# Patient Record
Sex: FEMALE | Race: BLACK OR AFRICAN AMERICAN | HISPANIC OR LATINO
[De-identification: names, ages, dates, MRNs, and addresses within clinical notes are randomized per-mention and may not be internally consistent; named-entity substitution may affect disease eponyms.]

---

## 2020-10-06 ENCOUNTER — APPOINTMENT (OUTPATIENT)
Dept: OBGYN | Facility: CLINIC | Age: 60
End: 2020-10-06
Payer: COMMERCIAL

## 2020-10-06 VITALS
SYSTOLIC BLOOD PRESSURE: 118 MMHG | BODY MASS INDEX: 32.58 KG/M2 | WEIGHT: 220 LBS | DIASTOLIC BLOOD PRESSURE: 80 MMHG | HEIGHT: 69 IN

## 2020-10-06 DIAGNOSIS — Z86.39 PERSONAL HISTORY OF OTHER ENDOCRINE, NUTRITIONAL AND METABOLIC DISEASE: ICD-10-CM

## 2020-10-06 DIAGNOSIS — R87.619 UNSPECIFIED ABNORMAL CYTOLOGICAL FINDINGS IN SPECIMENS FROM CERVIX UTERI: ICD-10-CM

## 2020-10-06 DIAGNOSIS — I10 ESSENTIAL (PRIMARY) HYPERTENSION: ICD-10-CM

## 2020-10-06 DIAGNOSIS — Z83.3 FAMILY HISTORY OF DIABETES MELLITUS: ICD-10-CM

## 2020-10-06 LAB
DATE COLLECTED: NORMAL
HEMOCCULT SP1 STL QL: NEGATIVE
QUALITY CONTROL: YES

## 2020-10-06 PROCEDURE — 99396 PREV VISIT EST AGE 40-64: CPT

## 2020-10-06 PROCEDURE — 82270 OCCULT BLOOD FECES: CPT

## 2020-10-06 PROCEDURE — 99214 OFFICE O/P EST MOD 30 MIN: CPT | Mod: 25

## 2020-10-06 NOTE — COUNSELING
[Nutrition/ Exercise/ Weight Management] : nutrition, exercise, weight management [Vitamins/Supplements] : vitamins/supplements [Sunscreen] : sunscreen [Smoking Cessation] : smoking cessation [Drugs/Alcohol] : drugs, alcohol [STD (testing, results, tx)] : STD (testing, results, tx)

## 2020-10-06 NOTE — HISTORY OF PRESENT ILLNESS
[FreeTextEntry1] : 59yo G1 P 0010 PMF with T2DM and HTN here for annual. Pt had "pimple-like skin lesion" under left breast that she never follow up on.  She says it is now saying that it is getting larger.  Pt has not had any recent travel, known sick contacts or contact with any PUI, of coronavirus sxs.\par

## 2020-10-06 NOTE — DISCUSSION/SUMMARY
[FreeTextEntry1] : 59yo  PMF here for annual. Pt with lesion on breast today that could be skin related vs breast related (pt reports having normal mammo and sono last year). \par \par Breast lesion: \par - mammo/sono rx given today. Pt urged to go ASAP  \par - Pt urged to see Derm ASAP in the event this is not a breast issue\par \par Goiter: \par - Thyroid sono ordered today \par \par RHM: \par - DVS neg x 3\par - Dentist, PCP, Derm as discussed \par - Rx given for DEXA, mammo/sono\par - Colonoscopy as discussed (given Dr Helm's info) \par - Offered STI screen today. Pt declined \par - Encouraged healthy diet, exercise, weight loss \par \par Varsha Brunson MD \par Obstetrician/Gynecologist\par

## 2020-10-13 LAB
CYTOLOGY CVX/VAG DOC THIN PREP: NORMAL
HPV HIGH+LOW RISK DNA PNL CVX: NOT DETECTED

## 2020-11-13 ENCOUNTER — APPOINTMENT (OUTPATIENT)
Dept: OBGYN | Facility: CLINIC | Age: 60
End: 2020-11-13
Payer: COMMERCIAL

## 2020-11-13 ENCOUNTER — ASOB RESULT (OUTPATIENT)
Age: 60
End: 2020-11-13

## 2020-11-13 PROCEDURE — 76856 US EXAM PELVIC COMPLETE: CPT | Mod: 59

## 2020-11-13 PROCEDURE — 76830 TRANSVAGINAL US NON-OB: CPT | Mod: 59

## 2020-11-25 ENCOUNTER — APPOINTMENT (OUTPATIENT)
Dept: OBGYN | Facility: CLINIC | Age: 60
End: 2020-11-25
Payer: COMMERCIAL

## 2020-11-25 PROCEDURE — 99213 OFFICE O/P EST LOW 20 MIN: CPT | Mod: 95

## 2020-12-28 NOTE — HISTORY OF PRESENT ILLNESS
[Home] : at home, [unfilled] , at the time of the visit. [Verbal consent obtained from patient] : the patient, [unfilled] [FreeTextEntry1] : 59yo G1 P 0010 PMF with T2DM and HTN being followed up via telehealth visit to review sono results. Her sono demonstrates a 6 x 6 x6cm subserosal fibroid and a 2mm EMS. Neither ovary was visualized. \par \par Her pap, HPV, and FOBT are neg.

## 2020-12-28 NOTE — DISCUSSION/SUMMARY
[FreeTextEntry1] : 61yo G1 P 0010 PMF with T2DM and HTN with 1- 6cm fibroid. We discussed that fibroid usually don’t grow in menopause however we need to closely monitor them. I would like to compare her prior sono results to see if there has been any growth. If there are no sonos to compare we will repeat sono in 3 months to establish stability.  All questions solicited and answered. \par \par Varsha Brunson MD \par Obstetrician/Gynecologist\par

## 2021-01-04 ENCOUNTER — RESULT REVIEW (OUTPATIENT)
Age: 61
End: 2021-01-04

## 2021-01-04 ENCOUNTER — APPOINTMENT (OUTPATIENT)
Dept: MAMMOGRAPHY | Facility: CLINIC | Age: 61
End: 2021-01-04
Payer: COMMERCIAL

## 2021-01-04 PROCEDURE — 77067 SCR MAMMO BI INCL CAD: CPT

## 2021-01-04 PROCEDURE — 77063 BREAST TOMOSYNTHESIS BI: CPT

## 2021-01-15 DIAGNOSIS — R92.2 INCONCLUSIVE MAMMOGRAM: ICD-10-CM

## 2021-02-15 ENCOUNTER — RESULT REVIEW (OUTPATIENT)
Age: 61
End: 2021-02-15

## 2021-02-15 ENCOUNTER — APPOINTMENT (OUTPATIENT)
Dept: MAMMOGRAPHY | Facility: CLINIC | Age: 61
End: 2021-02-15
Payer: COMMERCIAL

## 2021-02-15 ENCOUNTER — APPOINTMENT (OUTPATIENT)
Dept: ULTRASOUND IMAGING | Facility: CLINIC | Age: 61
End: 2021-02-15
Payer: COMMERCIAL

## 2021-02-15 PROCEDURE — 76642 ULTRASOUND BREAST LIMITED: CPT | Mod: 50

## 2021-02-15 PROCEDURE — 77066 DX MAMMO INCL CAD BI: CPT

## 2021-02-15 PROCEDURE — G0279: CPT

## 2021-02-16 ENCOUNTER — NON-APPOINTMENT (OUTPATIENT)
Age: 61
End: 2021-02-16

## 2021-03-16 ENCOUNTER — APPOINTMENT (OUTPATIENT)
Dept: ULTRASOUND IMAGING | Facility: CLINIC | Age: 61
End: 2021-03-16
Payer: COMMERCIAL

## 2021-03-16 PROCEDURE — 76536 US EXAM OF HEAD AND NECK: CPT

## 2021-03-24 ENCOUNTER — NON-APPOINTMENT (OUTPATIENT)
Age: 61
End: 2021-03-24

## 2021-03-29 ENCOUNTER — APPOINTMENT (OUTPATIENT)
Dept: BREAST CENTER | Facility: CLINIC | Age: 61
End: 2021-03-29
Payer: COMMERCIAL

## 2021-03-29 VITALS
TEMPERATURE: 97.3 F | HEART RATE: 71 BPM | DIASTOLIC BLOOD PRESSURE: 84 MMHG | HEIGHT: 69 IN | WEIGHT: 210 LBS | BODY MASS INDEX: 31.1 KG/M2 | SYSTOLIC BLOOD PRESSURE: 142 MMHG

## 2021-03-29 DIAGNOSIS — R92.8 OTHER ABNORMAL AND INCONCLUSIVE FINDINGS ON DIAGNOSTIC IMAGING OF BREAST: ICD-10-CM

## 2021-03-29 PROCEDURE — 99243 OFF/OP CNSLTJ NEW/EST LOW 30: CPT

## 2021-03-29 PROCEDURE — 99072 ADDL SUPL MATRL&STAF TM PHE: CPT

## 2021-03-29 NOTE — DATA REVIEWED
[FreeTextEntry1] : 1/4/21 Ashly: bilat sMMG, TC 23.6% scattered FG density, asymmetric densities in inf breasts bilat, 5-6% right and 7:00 left. BR0 for spot compression and u/s.\par \par 2/15/21 Ashly: bilat u/s and compression: TC 21.6% Right asymmetry mostly dissipates with compression. Left asymmetry stable back to 2019. Right f.u mmg rec in 6 mos.  Bilat U/s: Right 4 mm cyst at 6:00, Left: 3.5 cm skin thickening unchanged from 2019.  no

## 2021-03-29 NOTE — ASSESSMENT
[FreeTextEntry1] : 59 y/o black female referred for consultation per Dr. Varsha Brunson for high risk of breast cancer. TC 21.6%\par \par 1/4/21 [facility]: bilat sMMG, TC 23.6% scattered FG density, asymmetric densities in inf breasts bilat, 5-6% right and 7:00 left. BR0 for spot compression and u/s.\par \par 2/15/21 Ashly: bilat u/s and compression: TC 21.6% Right asymmetry mostly dissipates with compression. Left asymmetry stable back to 2019. Right f.u mmg rec in 6 mos.  Bilat U/s: Right 4 mm cyst at 6:00, Left: 3.5 cm skin thickening unchanged from 2019, BR3\par \par Fhx; Breast cancer mother at 58 and pGM at 70. Pt is not AJ and no daughters. Has 1 sister. \par \par Pt denies any breast lesions, discharge or masses. Has an area of breast thickening after folliculitis she has had for years. Saw dermatologist. \par CBE: No suspicious findings. Pt with keloid/hypertrophic scar at inferior left breast from hx of folliculitis. No axillary or SC lymphadenopathy.\par Reviewed studies with pt. Rec for 6 mos f.u mmg 8/21, can do it at the same time as MRI. Would rec HRP due to increased LT risk. Recalculated TC was 30.5% lifetime risk.\par Reviewed rec for mmg/u/s screening alternating with MRI screening q 6 mos with CBE q 6 mos (can see PCP or gyn for one of them). F.u sooner in interim if breast issues. Rec HRP with NP.

## 2021-03-29 NOTE — HISTORY OF PRESENT ILLNESS
[FreeTextEntry1] : 59 y/o black female referred for consultation per Dr. Varsha Brunson for high risk of breast cancer. TC 21.6%\par \par 1/4/21 Ash[facility]: bilat sMMG, TC 23.6% scattered FG density, asymmetric densities in inf breasts bilat, 5-6% right and 7:00 left. BR0 for spot compression and u/s.\par \par 2/15/21 Ash[facility]: bilat u/s and compression: TC 21.6% Right asymmetry mostly dissipates with compression. Left asymmetry stable back to 2019. Right f.u mmg rec in 6 mos.  Bilat U/s: Right 4 mm cyst at 6:00, Left: 3.5 cm skin thickening unchanged from 2019, BR3\par \par Fhx; Breast cancer mother at 58 and pGM at 70. Pt is not AJ and no daughters. Has 1 sister. \par \par Pt denies any breast lesions, discharge or masses. Has an area of breast thickening after folliculitis she has had for years. Saw dermatologist.

## 2021-03-29 NOTE — PHYSICAL EXAM
[Bra Size: ___] : Bra Size: [unfilled] [Normocephalic] : normocephalic [Atraumatic] : atraumatic [Supple] : supple [No Supraclavicular Adenopathy] : no supraclavicular adenopathy [Examined in the supine and seated position] : examined in the supine and seated position [No dominant masses] : no dominant masses in right breast  [No dominant masses] : no dominant masses left breast [No Nipple Retraction] : no left nipple retraction [No Nipple Discharge] : no left nipple discharge [No Axillary Lymphadenopathy] : no left axillary lymphadenopathy [No Edema] : no edema [No Swelling] : no swelling [Full ROM] : full range of motion [No Rashes] : no rashes [No Ulceration] : no ulceration [de-identified] : Has goiter [de-identified] : Inferior part of flap, consistent with 2 cm area of hypertrophic/keloid scar.

## 2021-03-29 NOTE — PAST MEDICAL HISTORY
[Menarche Age ____] : age at menarche was [unfilled] [Menopause Age____] : age at menopause was [unfilled] [Total Preg ___] : G[unfilled] [Live Births ___] : P[unfilled]  [Living ___] : Living: [unfilled]

## 2021-03-29 NOTE — CONSULT LETTER
[Dear  ___] : Dear  [unfilled], [Consult Letter:] : I had the pleasure of evaluating your patient, [unfilled]. [Please see my note below.] : Please see my note below. [Consult Closing:] : Thank you very much for allowing me to participate in the care of this patient.  If you have any questions, please do not hesitate to contact me. [Sincerely,] : Sincerely, [FreeTextEntry3] : Nevaeh Carver MD  [DrStewart  ___] : Dr. GUADARRAMA

## 2021-04-20 ENCOUNTER — APPOINTMENT (OUTPATIENT)
Dept: ENDOCRINOLOGY | Facility: CLINIC | Age: 61
End: 2021-04-20
Payer: COMMERCIAL

## 2021-04-20 VITALS
DIASTOLIC BLOOD PRESSURE: 70 MMHG | SYSTOLIC BLOOD PRESSURE: 130 MMHG | HEART RATE: 72 BPM | HEIGHT: 69 IN | BODY MASS INDEX: 31.55 KG/M2 | WEIGHT: 213 LBS

## 2021-04-20 DIAGNOSIS — Z78.9 OTHER SPECIFIED HEALTH STATUS: ICD-10-CM

## 2021-04-20 LAB — GLUCOSE BLDC GLUCOMTR-MCNC: 269

## 2021-04-20 PROCEDURE — 99204 OFFICE O/P NEW MOD 45 MIN: CPT | Mod: 25

## 2021-04-20 PROCEDURE — 99072 ADDL SUPL MATRL&STAF TM PHE: CPT

## 2021-04-20 PROCEDURE — 82962 GLUCOSE BLOOD TEST: CPT

## 2021-04-20 NOTE — HISTORY OF PRESENT ILLNESS
[FreeTextEntry1] : Prior endo - 25 yrs ago, Dr. Claudio ([facility])\par =========================================================================\par Quality/Duration/Onset: Goiter on exam for many years. Thyroid sonogram 3/2021 showed thyroid nodules.\par Severity: mild\par Associated symptoms: Denies anterior neck pain, dysphagia or voice changes. No thyroid meds. No herbal meds.\par \par MODIFYING FACTORS: \par Medication history - no thyroid meds\par Current thyroid medication: none\par Herbal meds/OTC - cod liver oil, vit D, turmeric\par No family history of thyroid disease. No radiation exposure to the neck\par ========================================================================\par Quality: Type 2\par Severity: severe, uncontrolled\par Duration:2012\par Onset: routine blood test, gluc 350's\par \par ASSOCIATED SYMPTOMS/COMPLICATIONS:\par Eye exam - (+) retinal issue, eye doctor - Dr. Walker, retina: \par Urine microalb/Cr neg 4/2021\par Neuropathy (+) itchiness on feet\par \par MODIFYING FACTORS: \par Lifestyle:Inconsistent w diet - stopped diet in 2020 and restarted diet 1/2021 - low crab and intermittent fastig 1-2 days/week.  She does not exercise.\par Medication history: Initially started on Glyburide and MFN.  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 Has not been testing FS\par \par \par

## 2021-04-20 NOTE — DATA REVIEWED
[FreeTextEntry1] : Thyroid sonogram 3/16/21 - Left nodule 6 mm, 6 mm and 5 mm nodules - hypoechoic\par \par Labs 4/9/21\par urine alb/Cr 4\par , HDL 50, Tg 85\par Gluc 226\par Cr 0.63, GFR 99\par A1c 9.9\par TSH 2.55, Ft4 1.3\par B12 555\par Vit D 16

## 2021-04-20 NOTE — REASON FOR VISIT
[Initial Evaluation] : an initial evaluation [DM Type 2] : DM Type 2 [Thyroid nodule/ MNG] : thyroid nodule/ MNG [FreeTextEntry2] : self referred

## 2021-04-20 NOTE — CONSULT LETTER
[Dear  ___] : Dear  [unfilled], [Courtesy Letter:] : I had the pleasure of seeing your patient, [unfilled], in my office today. [Please see my note below.] : Please see my note below. [Consult Closing:] : Thank you very much for allowing me to participate in the care of this patient.  If you have any questions, please do not hesitate to contact me. [Sincerely,] : Sincerely, [FreeTextEntry3] : Melina Dickson, \par

## 2021-04-20 NOTE — PHYSICAL EXAM
[Alert] : alert [Obese] : obese [EOMI] : extra ocular movement intact [No LAD] : no lymphadenopathy [No Thyroid Nodules] : no palpable thyroid nodules [No Accessory Muscle Use] : no accessory muscle use [Clear to Auscultation] : lungs were clear to auscultation bilaterally [Normal S1, S2] : normal S1 and S2 [Normal Rate] : heart rate was normal [No Edema] : no peripheral edema [Normal Bowel Sounds] : normal bowel sounds [Not Tender] : non-tender [Soft] : abdomen soft [Normal Gait] : normal gait [Foot Ulcers] : no foot ulcers [Right Foot Was Examined] : right foot ~C was examined [Left Foot Was Examined] : left foot ~C was examined [2+] : 2+ in the dorsalis pedis [Vibration Dec.] : normal vibratory sensation at the level of the toes [Position Sense Dec.] : normal position sense at the level of the toes [Diminished Throughout Both Feet] : normal tactile sensation with monofilament testing throughout both feet [Cranial Nerves Intact] : cranial nerves 2-12 were intact [Oriented x3] : oriented to person, place, and time [Normal Affect] : the affect was normal [Normal Insight/Judgement] : insight and judgment were intact [Normal Mood] : the mood was normal [de-identified] : trace thyroid enlargement, nontender, soft

## 2021-04-20 NOTE — ASSESSMENT
[Long Term Vascular Complications] : long term vascular complications of diabetes [Importance of Diet and Exercise] : importance of diet and exercise to improve glycemic control, achieve weight loss and improve cardiovascular health [Self Monitoring of Blood Glucose] : self monitoring of blood glucose [Retinopathy Screening] : Patient was referred to ophthalmology for retinopathy screening [FreeTextEntry1] : 60 yr old female with:\par 1. poorly controlled T2DM with possible retinopathy- A1c 9.9%\par - she refuses insulin and oral meds and prefers to control diabetes w diet\par - advised against not taking meds but she should take meds along w lifestyle changes to help control diabetes and reduce long term complications, we also discussed intermittent fasting - advises against prolonged fasting which can cause nutritional deficiencies\par - repeat A1c 3 months if no improvement/minimal improvement - pt may agree to take MFN, she "will never take insulin"\par - also needle phobic and cannot test FS w lancet and prefers to use Freestyle Ernst, Rx sent\par - discussed fam hx of diabetes and how genetics may play a role in hyperglycemia\par - f/u ophthalmology and retina\par \par 2. HLD - does not want statin, repeat levels 3 months, risks of hyperlipidemia discussed\par \par 3. small Left thyroid nodule - she is asx and euthyroid, cont to monitor w repeat sonogram in 6 months, no FNA biopsy at this time\par \par 4. Vit D def - Vit D load x12 weeks followed by daily OTC vit D, repeat levels 3 months\par \par

## 2021-07-28 ENCOUNTER — APPOINTMENT (OUTPATIENT)
Dept: ENDOCRINOLOGY | Facility: CLINIC | Age: 61
End: 2021-07-28

## 2021-08-12 ENCOUNTER — APPOINTMENT (OUTPATIENT)
Dept: MRI IMAGING | Facility: CLINIC | Age: 61
End: 2021-08-12

## 2021-08-12 ENCOUNTER — RESULT REVIEW (OUTPATIENT)
Age: 61
End: 2021-08-12

## 2021-08-12 ENCOUNTER — APPOINTMENT (OUTPATIENT)
Dept: MAMMOGRAPHY | Facility: CLINIC | Age: 61
End: 2021-08-12
Payer: COMMERCIAL

## 2021-08-12 PROCEDURE — A9585: CPT

## 2021-08-12 PROCEDURE — 77049 MRI BREAST C-+ W/CAD BI: CPT

## 2021-08-12 PROCEDURE — G0279: CPT | Mod: RT

## 2021-08-12 PROCEDURE — 77065 DX MAMMO INCL CAD UNI: CPT | Mod: RT

## 2021-08-18 ENCOUNTER — APPOINTMENT (OUTPATIENT)
Dept: BREAST CENTER | Facility: CLINIC | Age: 61
End: 2021-08-18
Payer: COMMERCIAL

## 2021-08-18 VITALS
DIASTOLIC BLOOD PRESSURE: 73 MMHG | HEART RATE: 74 BPM | HEIGHT: 69 IN | SYSTOLIC BLOOD PRESSURE: 131 MMHG | BODY MASS INDEX: 31.4 KG/M2 | WEIGHT: 212 LBS | TEMPERATURE: 97.3 F

## 2021-08-18 DIAGNOSIS — Z80.3 FAMILY HISTORY OF MALIGNANT NEOPLASM OF BREAST: ICD-10-CM

## 2021-08-18 PROCEDURE — 99213 OFFICE O/P EST LOW 20 MIN: CPT

## 2021-08-18 NOTE — ASSESSMENT
[FreeTextEntry1] : 61 y/o black female here for f/u for high risk clinic and recent imaging results.\par referred by Dr. Varsha Brunson\par \par 1/4/21 Ashly: bilat sMMG, TC 23.6% scattered FG density, asymmetric densities in inf breasts bilat, 5-6% right and 7:00 left. BR0 for spot compression and u/s.\par \par 2/15/21 Ash[facility]: bilat u/s and compression: TC 21.6% Right asymmetry mostly dissipates with compression. Left asymmetry stable back to 2019. Right f.u mmg rec in 6 mos. Bilat U/s: Right 4 mm cyst at 6:00, Left: 3.5 cm skin thickening unchanged from 2019, BR3\par \par 8/12/21, Ashly, MRI: bilat scattered enhancing nonspecific foci, no susp findings, L 3x2cm round 6:00 enhancing lesion lilliana to stable mmg finding, Rec resume annual mmg on schedule and clinical eval of skin lesion, BR2\par 8/12/21, Ashly, R dMMG: mid 6:00 nodular asymmetry not significantly changes from prior exams, f/u bilat mmg rec in 6 mos to restore pt annual screening intervals, BR3\par \par Fhx; Breast cancer mother at 58 and pGM at 70. Pt is not AJ and no daughters. Has 1 sister. \par \par Pt denies any breast lesions, discharge or masses. Has an area of breast thickening after folliculitis she has had for years. Saw dermatologist. \par CBE: No suspicious findings. Keloid/hypertrophic scar at inferior left breast from hx of folliculitis. No axillary or SC lymphadenopathy.\par Reviewed recent R dMMG and MRI w/ pt. Benign findings. Pt due for annual GYN exam w/ Dr. Brunson in 11/21 and bilat mmg in 2/22. will also get U/S to document stability of R cyst at 6:00 and L skin thickening. Discussed with patient and pt amenable w/ plan. Pt will f/u after imaging w/ me 3/22 or sooner as needed.

## 2021-08-18 NOTE — DATA REVIEWED
[FreeTextEntry1] : 8/12/21, Ashly, MRI: bilat scattered enhancing nonspecific foci, no susp findings, L 3x2cm round 6:00 enhancing lesion lilliana to stable mmg finding, Rec resume annual mmg on schedule and clinical eval of skin lesion, BR2\par 8/12/21, RUBEN Limon dMMG: mid 6:00 nodular asymmetry not significantly changes from prior exams, f/u bilat mmg rec in 6 mos to restore pt annual screening intervals, BR3

## 2021-08-18 NOTE — HISTORY OF PRESENT ILLNESS
[FreeTextEntry1] : 61 y/o black female here for f/u for high risk clinic and recent imaging results.\par referred by Dr. Varsha Brunson\par \par 1/4/21 Ashly: bilat sMMG, TC 23.6% scattered FG density, asymmetric densities in inf breasts bilat, 5-6% right and 7:00 left. BR0 for spot compression and u/s.\par \par 2/15/21 Ashly: bilat u/s and compression: TC 21.6% Right asymmetry mostly dissipates with compression. Left asymmetry stable back to 2019. Right f.u mmg rec in 6 mos. Bilat U/s: Right 4 mm cyst at 6:00, Left: 3.5 cm skin thickening unchanged from 2019, BR3\par \par 8/12/21, Ahsly, MRI: bilat scattered enhancing nonspecific foci, no susp findings, L 3x2cm round 6:00 enhancing lesion lilliana to stable mmg finding, Rec resume annual mmg on schedule and clinical eval of skin lesion, BR2\par 8/12/21, RUBEN Limon dMMG: mid 6:00 nodular asymmetry not significantly changes from prior exams, f/u bilat mmg rec in 6 mos to restore pt annual screening intervals, BR3\par \par Fhx; Breast cancer mother at 58 and pGM at 70. Pt is not AJ and no daughters. Has 1 sister. \par \par Pt denies any breast lesions, discharge or masses. Has an area of breast thickening after folliculitis she has had for years. Saw dermatologist. \par \par \par \par

## 2021-08-18 NOTE — REVIEW OF SYSTEMS
[Negative] : Heme/Lymph [FreeTextEntry4] : Pt has allergies this season and is taking Zyrtec which is helping

## 2021-10-06 ENCOUNTER — RX RENEWAL (OUTPATIENT)
Age: 61
End: 2021-10-06

## 2021-10-19 ENCOUNTER — NON-APPOINTMENT (OUTPATIENT)
Age: 61
End: 2021-10-19

## 2021-10-19 ENCOUNTER — APPOINTMENT (OUTPATIENT)
Dept: OBGYN | Facility: CLINIC | Age: 61
End: 2021-10-19
Payer: COMMERCIAL

## 2021-10-19 ENCOUNTER — APPOINTMENT (OUTPATIENT)
Dept: OBGYN | Facility: CLINIC | Age: 61
End: 2021-10-19

## 2021-10-19 VITALS
DIASTOLIC BLOOD PRESSURE: 80 MMHG | BODY MASS INDEX: 32.52 KG/M2 | WEIGHT: 219.56 LBS | HEIGHT: 69 IN | SYSTOLIC BLOOD PRESSURE: 116 MMHG

## 2021-10-19 DIAGNOSIS — E04.9 NONTOXIC GOITER, UNSPECIFIED: ICD-10-CM

## 2021-10-19 DIAGNOSIS — Z82.49 FAMILY HISTORY OF ISCHEMIC HEART DISEASE AND OTHER DISEASES OF THE CIRCULATORY SYSTEM: ICD-10-CM

## 2021-10-19 DIAGNOSIS — J30.2 OTHER SEASONAL ALLERGIC RHINITIS: ICD-10-CM

## 2021-10-19 PROCEDURE — 99396 PREV VISIT EST AGE 40-64: CPT

## 2021-10-19 PROCEDURE — XXXXX: CPT

## 2021-10-26 ENCOUNTER — APPOINTMENT (OUTPATIENT)
Dept: RADIOLOGY | Facility: CLINIC | Age: 61
End: 2021-10-26
Payer: COMMERCIAL

## 2021-10-26 PROCEDURE — 77080 DXA BONE DENSITY AXIAL: CPT

## 2021-10-27 LAB
CYTOLOGY CVX/VAG DOC THIN PREP: NORMAL
HPV HIGH+LOW RISK DNA PNL CVX: NOT DETECTED

## 2022-02-22 ENCOUNTER — RESULT REVIEW (OUTPATIENT)
Age: 62
End: 2022-02-22

## 2022-02-22 ENCOUNTER — APPOINTMENT (OUTPATIENT)
Dept: ULTRASOUND IMAGING | Facility: CLINIC | Age: 62
End: 2022-02-22

## 2022-02-22 ENCOUNTER — APPOINTMENT (OUTPATIENT)
Dept: MAMMOGRAPHY | Facility: CLINIC | Age: 62
End: 2022-02-22
Payer: COMMERCIAL

## 2022-02-22 PROCEDURE — 77063 BREAST TOMOSYNTHESIS BI: CPT

## 2022-02-22 PROCEDURE — 77067 SCR MAMMO BI INCL CAD: CPT

## 2022-02-22 PROCEDURE — 76641 ULTRASOUND BREAST COMPLETE: CPT | Mod: 50

## 2022-03-02 ENCOUNTER — APPOINTMENT (OUTPATIENT)
Dept: BREAST CENTER | Facility: CLINIC | Age: 62
End: 2022-03-02
Payer: COMMERCIAL

## 2022-03-02 VITALS
DIASTOLIC BLOOD PRESSURE: 86 MMHG | RESPIRATION RATE: 17 BRPM | TEMPERATURE: 97.5 F | HEART RATE: 80 BPM | SYSTOLIC BLOOD PRESSURE: 145 MMHG

## 2022-03-02 DIAGNOSIS — R92.8 OTHER ABNORMAL AND INCONCLUSIVE FINDINGS ON DIAGNOSTIC IMAGING OF BREAST: ICD-10-CM

## 2022-03-02 PROCEDURE — 99213 OFFICE O/P EST LOW 20 MIN: CPT

## 2022-03-02 NOTE — DISCUSSION/SUMMARY
[FreeTextEntry1] : 60yo G1 P 0010 PMF with T2DM and HTN here for annual. Pt has 6cm fibroid as well\par \par Fibroid uterus:\par - RTO ASAP for TVUS for monitoring\par \par elevated TC score:\par - jonn=tinue follow up with Dr Carver \par - MRI per her \par \par RHM: \par - DVS neg x3\par - Dentist, PCP, Derm as discussed \par - Rx given for DEXA, mammo/sono\par - Colonoscopy as discussed \par - Offered STI screen today. Pt  declined \par - Encouraged healthy diet, exercise, weight loss \par \par Varsha Maurer MD \par Obstetrician/Gynecologist\par

## 2022-03-02 NOTE — HISTORY OF PRESENT ILLNESS
[Patient reported mammogram was normal] : Patient reported mammogram was normal [Patient reported breast sonogram was normal] : Patient reported breast sonogram was normal [Patient reported PAP Smear was normal] : Patient reported PAP Smear was normal [FreeTextEntry1] : 62yo G1 P 0010 PMF with T2DM and HTN here for annual.  Pt is being followed by Dr Carver for elevated TC score \par  [Mammogramdate] : 2020 [BreastSonogramDate] : 2020 [PapSmeardate] : 2020

## 2022-03-04 NOTE — DATA REVIEWED
[FreeTextEntry1] : 2/22/22, Arya Limon sMMG: FG, no susp findings; Bilat U/S: bilat no susp solid mass, rec mmg 1 year, BR1

## 2022-03-04 NOTE — HISTORY OF PRESENT ILLNESS
[FreeTextEntry1] : 60 y/o black female here for f/u for high risk clinic and recent imaging results. Pt noticed a pimple under her R breast a few days ago that is tender. She has had this before on the L and she saw a dermatologist for it.\par referred by Dr. Varsha Brunson\par \par 1/4/21 Ashly: bilat sMMG, TC 23.6% scattered FG density, asymmetric densities in inf breasts bilat, 5-6% right and 7:00 left. BR0 for spot compression and u/s.\par \par 2/15/21 Ashly: bilat u/s and compression: TC 21.6% Right asymmetry mostly dissipates with compression. Left asymmetry stable back to 2019. Right f.u mmg rec in 6 mos. Bilat U/s: Right 4 mm cyst at 6:00, Left: 3.5 cm skin thickening unchanged from 2019, BR3\par \par 8/12/21, Ashly, MRI: bilat scattered enhancing nonspecific foci, no susp findings, L 3x2cm round 6:00 enhancing lesion lilliana to stable mmg finding, Rec resume annual mmg on schedule and clinical eval of skin lesion, BR2\par 8/12/21, RUBEN Limon dMMG: mid 6:00 nodular asymmetry not significantly changes from prior exams, f/u bilat mmg rec in 6 mos to restore pt annual screening intervals, BR3\par 2/22/22, Arya Limon sMMG: FG, no susp findings; Bilat U/S: bilat no susp solid mass, rec mmg 1 year, BR1\par \par Fhx; Breast cancer mother at 58 and pGM at 70. Pt is not AJ and no daughters. Has 1 sister. \par \par Pt denies any breast lesions, discharge or masses. Has an area of breast thickening after folliculitis she has had for years. Saw dermatologist. \par

## 2022-03-04 NOTE — ASSESSMENT
[FreeTextEntry1] : 62 y/o black female here for f/u for high risk clinic and recent imaging results. Pt noticed a pimple under her R breast a few days ago that is tender. She has had this before on the L and she saw a dermatologist for it.\par referred by Dr. Varsha Brunson\par \par 1/4/21 Ashly: bilat sMMG, TC 23.6% scattered FG density, asymmetric densities in inf breasts bilat, 5-6% right and 7:00 left. BR0 for spot compression and u/s.\par \par 2/15/21 Ashly: bilat u/s and compression: TC 21.6% Right asymmetry mostly dissipates with compression. Left asymmetry stable back to 2019. Right f.u mmg rec in 6 mos. Bilat U/s: Right 4 mm cyst at 6:00, Left: 3.5 cm skin thickening unchanged from 2019, BR3\par \par 8/12/21, Ashly, MRI: bilat scattered enhancing nonspecific foci, no susp findings, L 3x2cm round 6:00 enhancing lesion lilliana to stable mmg finding, Rec resume annual mmg on schedule and clinical eval of skin lesion, BR2\par 8/12/21, RUBEN Limon dMMG: mid 6:00 nodular asymmetry not significantly changes from prior exams, f/u bilat mmg rec in 6 mos to restore pt annual screening intervals, BR3\par 2/22/22, Arya Limon sMMG: FG, no susp findings; Bilat U/S: bilat no susp solid mass, rec mmg 1 year, BR1\par \par Fhx; Breast cancer mother at 58 and pGM at 70. Pt is not AJ and no daughters. Has 1 sister. \par \par Pt denies any breast lesions, discharge or masses. Has an area of breast thickening after folliculitis she has had for years. Saw dermatologist. \par CBE: No suspicious findings. Keloid/hypertrophic scar at inferior left breast from hx of folliculitis. R IMF ? pimple w/ some erythema/tenderness to area. No axillary or SC lymphadenopathy.\par Reviewed recent imaging - Benign findings. sMRI due 8/22. Pt due for annual GYN exam in 11/22. Rec for pt to f/u w/ Derm/PCP for R ? pimple w/ tenderness. Discussed with patient and pt amenable w/ plan. Pt will f/u after Bilat sMMG and U/S w/ me 3/23 or sooner as needed.

## 2022-03-04 NOTE — PHYSICAL EXAM
[Normocephalic] : normocephalic [Atraumatic] : atraumatic [Supple] : supple [No Supraclavicular Adenopathy] : no supraclavicular adenopathy [No Thyromegaly] : no thyromegaly [Examined in the supine and seated position] : examined in the supine and seated position [Bra Size: ___] : Bra Size: [unfilled] [No dominant masses] : no dominant masses in right breast  [No dominant masses] : no dominant masses left breast [No Nipple Retraction] : no left nipple retraction [No Nipple Discharge] : no left nipple discharge [No Axillary Lymphadenopathy] : no left axillary lymphadenopathy [No Edema] : no edema [No Swelling] : no swelling [Full ROM] : full range of motion [No Rashes] : no rashes [No Ulceration] : no ulceration [de-identified] : No suspicious findings. Keloid/hypertrophic scar at inferior left breast from hx of folliculitis. R IMF ? pimple w/ some erythema/tenderness to area. No axillary or SC lymphadenopathy.

## 2022-04-06 ENCOUNTER — APPOINTMENT (OUTPATIENT)
Dept: BREAST CENTER | Facility: CLINIC | Age: 62
End: 2022-04-06

## 2022-06-10 ENCOUNTER — NON-APPOINTMENT (OUTPATIENT)
Age: 62
End: 2022-06-10

## 2022-08-09 LAB
HBA1C MFR BLD HPLC: 12.6
LDLC SERPL DIRECT ASSAY-MCNC: 136
MICROALBUMIN/CREAT 24H UR-RTO: 16
TSH SERPL-ACNC: 1.35

## 2022-08-10 ENCOUNTER — RESULT CHARGE (OUTPATIENT)
Age: 62
End: 2022-08-10

## 2022-08-10 ENCOUNTER — APPOINTMENT (OUTPATIENT)
Dept: ENDOCRINOLOGY | Facility: CLINIC | Age: 62
End: 2022-08-10

## 2022-08-10 ENCOUNTER — RESULT REVIEW (OUTPATIENT)
Age: 62
End: 2022-08-10

## 2022-08-10 VITALS
SYSTOLIC BLOOD PRESSURE: 138 MMHG | HEART RATE: 76 BPM | BODY MASS INDEX: 31.84 KG/M2 | DIASTOLIC BLOOD PRESSURE: 82 MMHG | HEIGHT: 69 IN | WEIGHT: 215 LBS

## 2022-08-10 LAB — GLUCOSE BLDC GLUCOMTR-MCNC: 265

## 2022-08-10 PROCEDURE — 82962 GLUCOSE BLOOD TEST: CPT

## 2022-08-10 PROCEDURE — 99215 OFFICE O/P EST HI 40 MIN: CPT | Mod: 25

## 2022-08-10 NOTE — HISTORY OF PRESENT ILLNESS
[FreeTextEntry1] : Interval Hx - last seen 4/2021, yesterday had ?allergic reaction with hives and started Prednisone 20 mg daily for 4 days (on day 2)\par she stopped low carb diet and PCP started her on MFN when A1c 12.6 4/2022\par =========================================================================\par Quality/Duration/Onset: Goiter on exam for many years. Thyroid sonogram 3/2021 showed thyroid nodules.\par Severity: mild\par Associated symptoms: Denies anterior neck pain, dysphagia or voice changes. No thyroid meds. No herbal meds.\par \par MODIFYING FACTORS: \par Medication history - no thyroid meds\par Current thyroid medication: none\par Herbal meds/OTC - cod liver oil, vit D, turmeric\par No family history of thyroid disease. No radiation exposure to the neck\par ========================================================================\par Quality: Type 2\par Severity: severe, uncontrolled\par Duration:2012\par Onset: routine blood test, gluc 350's\par \par ASSOCIATED SYMPTOMS/COMPLICATIONS:\par Eye exam - (+) retinal issue, eye doctor - Dr. Walker, retina: Dr. Leif Barreto ([facility])\par Urine microalb/Cr neg 2022\par denies neuropathy\par \par MODIFYING FACTORS: worsening due to diet, refusal of meds )oral and insulin) in past\par \par Current DM meds: MFN  mg 2 tabs daily since 4/2022\par \par SMBG: needle phobia, not using Ernst CGM\par \par \par

## 2022-08-10 NOTE — REVIEW OF SYSTEMS
[Recent Weight Loss (___ Lbs)] : recent weight loss: [unfilled] lbs [Blurred Vision] : blurred vision [Chest Pain] : no chest pain [Shortness Of Breath] : no shortness of breath [SOB on Exertion] : no shortness of breath on exertion [Nausea] : no nausea [Abdominal Pain] : no abdominal pain [Diarrhea] : no diarrhea [Polyuria] : no polyuria [Nocturia] : no nocturia [Pain/Numbness of Digits] : no pain/numbness of digits [Polydipsia] : no polydipsia

## 2022-08-10 NOTE — ASSESSMENT
[FreeTextEntry1] : 60 yr old female with:\par 1. 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 HLD -cont statin, check levels\par \par 3. small Left thyroid nodules - she is asx and euthyroid, cont to monitor w repeat sonogram\par \par \par \par  [Long Term Vascular Complications] : long term vascular complications of diabetes [Carbohydrate Consistent Diet] : carbohydrate consistent diet [Importance of Diet and Exercise] : importance of diet and exercise to improve glycemic control, achieve weight loss and improve cardiovascular health [Self Monitoring of Blood Glucose] : self monitoring of blood glucose [Retinopathy Screening] : Patient was referred to ophthalmology for retinopathy screening

## 2022-08-10 NOTE — REASON FOR VISIT
[DM Type 2] : DM Type 2 [Thyroid nodule/ MNG] : thyroid nodule/ MNG [Follow - Up] : a follow-up visit

## 2022-08-10 NOTE — PHYSICAL EXAM
[Alert] : alert [No Acute Distress] : no acute distress [EOMI] : extra ocular movement intact [No Accessory Muscle Use] : no accessory muscle use [Clear to Auscultation] : lungs were clear to auscultation bilaterally [Normal S1, S2] : normal S1 and S2 [Normal Rate] : heart rate was normal [No Edema] : no peripheral edema [Not Tender] : non-tender [Soft] : abdomen soft [Normal Gait] : normal gait [Foot Ulcers] : no foot ulcers [2+] : 2+ in the dorsalis pedis [Vibration Dec.] : normal vibratory sensation at the level of the toes [Diminished Throughout Both Feet] : normal tactile sensation with monofilament testing throughout both feet [Oriented x3] : oriented to person, place, and time [Normal Affect] : the affect was normal [Normal Insight/Judgement] : insight and judgment were intact [Normal Mood] : the mood was normal [de-identified] : bilateral thyroid noduels

## 2022-08-10 NOTE — DATA REVIEWED
[FreeTextEntry1] : Thyroid sonogram 3/16/21 - Left nodule 6 mm, 6 mm and 5 mm nodules - hypoechoic\par \par Labs 4/9/21\par urine alb/Cr 4\par , HDL 50, Tg 85\par Gluc 226\par Cr 0.63, GFR 99\par A1c 9.9\par TSH 2.55, Ft4 1.3\par B12 555\par Vit D 16\par \par Labs 4/26/22\par urine alb/Cr 16\par , HDL 68\par A1c 12.6\par cr 0.76, GFR 86\par TSh 1.35

## 2022-09-01 ENCOUNTER — APPOINTMENT (OUTPATIENT)
Dept: ULTRASOUND IMAGING | Facility: CLINIC | Age: 62
End: 2022-09-01

## 2022-09-01 ENCOUNTER — APPOINTMENT (OUTPATIENT)
Dept: MRI IMAGING | Facility: CLINIC | Age: 62
End: 2022-09-01

## 2022-09-01 PROCEDURE — 76536 US EXAM OF HEAD AND NECK: CPT

## 2022-09-01 PROCEDURE — 77049 MRI BREAST C-+ W/CAD BI: CPT

## 2022-09-01 PROCEDURE — A9585: CPT

## 2022-09-02 ENCOUNTER — APPOINTMENT (OUTPATIENT)
Dept: ENDOCRINOLOGY | Facility: CLINIC | Age: 62
End: 2022-09-02

## 2022-09-02 ENCOUNTER — RESULT CHARGE (OUTPATIENT)
Age: 62
End: 2022-09-02

## 2022-09-02 ENCOUNTER — EMERGENCY (EMERGENCY)
Facility: HOSPITAL | Age: 62
LOS: 1 days | Discharge: HOME | End: 2022-09-02
Attending: EMERGENCY MEDICINE
Payer: COMMERCIAL

## 2022-09-02 VITALS
WEIGHT: 210.98 LBS | RESPIRATION RATE: 18 BRPM | HEIGHT: 69 IN | TEMPERATURE: 98 F | SYSTOLIC BLOOD PRESSURE: 157 MMHG | DIASTOLIC BLOOD PRESSURE: 86 MMHG | OXYGEN SATURATION: 97 % | HEART RATE: 101 BPM

## 2022-09-02 VITALS
HEIGHT: 69 IN | HEART RATE: 104 BPM | BODY MASS INDEX: 31.25 KG/M2 | DIASTOLIC BLOOD PRESSURE: 62 MMHG | OXYGEN SATURATION: 98 % | WEIGHT: 211 LBS | SYSTOLIC BLOOD PRESSURE: 128 MMHG

## 2022-09-02 VITALS
DIASTOLIC BLOOD PRESSURE: 73 MMHG | RESPIRATION RATE: 19 BRPM | TEMPERATURE: 98 F | OXYGEN SATURATION: 98 % | HEART RATE: 78 BPM | SYSTOLIC BLOOD PRESSURE: 130 MMHG

## 2022-09-02 DIAGNOSIS — E55.9 VITAMIN D DEFICIENCY, UNSPECIFIED: ICD-10-CM

## 2022-09-02 DIAGNOSIS — E04.2 NONTOXIC MULTINODULAR GOITER: ICD-10-CM

## 2022-09-02 DIAGNOSIS — E11.65 TYPE 2 DIABETES MELLITUS WITH HYPERGLYCEMIA: ICD-10-CM

## 2022-09-02 DIAGNOSIS — E78.5 HYPERLIPIDEMIA, UNSPECIFIED: ICD-10-CM

## 2022-09-02 LAB
A1C WITH ESTIMATED AVERAGE GLUCOSE RESULT: 12.3 % — HIGH (ref 4–5.6)
ACETONE SERPL-MCNC: NEGATIVE — SIGNIFICANT CHANGE UP
ALBUMIN SERPL ELPH-MCNC: 3.9 G/DL — SIGNIFICANT CHANGE UP (ref 3.3–5.2)
ALP SERPL-CCNC: 96 U/L — SIGNIFICANT CHANGE UP (ref 40–120)
ALT FLD-CCNC: 22 U/L — SIGNIFICANT CHANGE UP
ANION GAP SERPL CALC-SCNC: 12 MMOL/L — SIGNIFICANT CHANGE UP (ref 5–17)
AST SERPL-CCNC: 15 U/L — SIGNIFICANT CHANGE UP
BASE EXCESS BLDV CALC-SCNC: 3.9 MMOL/L — HIGH (ref -2–3)
BASOPHILS # BLD AUTO: 0.02 K/UL — SIGNIFICANT CHANGE UP (ref 0–0.2)
BASOPHILS NFR BLD AUTO: 0.2 % — SIGNIFICANT CHANGE UP (ref 0–2)
BILIRUB SERPL-MCNC: <0.2 MG/DL — LOW (ref 0.4–2)
BUN SERPL-MCNC: 15.6 MG/DL — SIGNIFICANT CHANGE UP (ref 8–20)
CALCIUM SERPL-MCNC: 9.7 MG/DL — SIGNIFICANT CHANGE UP (ref 8.4–10.5)
CHLORIDE SERPL-SCNC: 97 MMOL/L — LOW (ref 98–107)
CO2 SERPL-SCNC: 26 MMOL/L — SIGNIFICANT CHANGE UP (ref 22–29)
CREAT SERPL-MCNC: 0.73 MG/DL — SIGNIFICANT CHANGE UP (ref 0.5–1.3)
EGFR: 94 ML/MIN/1.73M2 — SIGNIFICANT CHANGE UP
EOSINOPHIL # BLD AUTO: 0.28 K/UL — SIGNIFICANT CHANGE UP (ref 0–0.5)
EOSINOPHIL NFR BLD AUTO: 3.4 % — SIGNIFICANT CHANGE UP (ref 0–6)
ESTIMATED AVERAGE GLUCOSE: 306 MG/DL — HIGH (ref 68–114)
GAS PNL BLDV: SIGNIFICANT CHANGE UP
GLUCOSE BLDC GLUCOMTR-MCNC: 507
GLUCOSE BLDC GLUCOMTR-MCNC: 547
GLUCOSE SERPL-MCNC: 341 MG/DL — HIGH (ref 70–99)
HCO3 BLDV-SCNC: 28 MMOL/L — SIGNIFICANT CHANGE UP (ref 22–29)
HCT VFR BLD CALC: 40.3 % — SIGNIFICANT CHANGE UP (ref 34.5–45)
HGB BLD-MCNC: 12.5 G/DL — SIGNIFICANT CHANGE UP (ref 11.5–15.5)
IMM GRANULOCYTES NFR BLD AUTO: 0.2 % — SIGNIFICANT CHANGE UP (ref 0–1.5)
LYMPHOCYTES # BLD AUTO: 2.67 K/UL — SIGNIFICANT CHANGE UP (ref 1–3.3)
LYMPHOCYTES # BLD AUTO: 32.6 % — SIGNIFICANT CHANGE UP (ref 13–44)
MCHC RBC-ENTMCNC: 28.1 PG — SIGNIFICANT CHANGE UP (ref 27–34)
MCHC RBC-ENTMCNC: 31 GM/DL — LOW (ref 32–36)
MCV RBC AUTO: 90.6 FL — SIGNIFICANT CHANGE UP (ref 80–100)
MONOCYTES # BLD AUTO: 0.6 K/UL — SIGNIFICANT CHANGE UP (ref 0–0.9)
MONOCYTES NFR BLD AUTO: 7.3 % — SIGNIFICANT CHANGE UP (ref 2–14)
NEUTROPHILS # BLD AUTO: 4.6 K/UL — SIGNIFICANT CHANGE UP (ref 1.8–7.4)
NEUTROPHILS NFR BLD AUTO: 56.3 % — SIGNIFICANT CHANGE UP (ref 43–77)
PCO2 BLDV: 45 MMHG — HIGH (ref 39–42)
PH BLDV: 7.41 — SIGNIFICANT CHANGE UP (ref 7.32–7.43)
PLATELET # BLD AUTO: 242 K/UL — SIGNIFICANT CHANGE UP (ref 150–400)
PO2 BLDV: 107 MMHG — HIGH (ref 25–45)
POCT - KETONE, BLOOD: 0.1
POTASSIUM SERPL-MCNC: 4.5 MMOL/L — SIGNIFICANT CHANGE UP (ref 3.5–5.3)
POTASSIUM SERPL-SCNC: 4.5 MMOL/L — SIGNIFICANT CHANGE UP (ref 3.5–5.3)
PROT SERPL-MCNC: 7.4 G/DL — SIGNIFICANT CHANGE UP (ref 6.6–8.7)
RBC # BLD: 4.45 M/UL — SIGNIFICANT CHANGE UP (ref 3.8–5.2)
RBC # FLD: 13.1 % — SIGNIFICANT CHANGE UP (ref 10.3–14.5)
SAO2 % BLDV: 99.2 % — SIGNIFICANT CHANGE UP
SODIUM SERPL-SCNC: 135 MMOL/L — SIGNIFICANT CHANGE UP (ref 135–145)
WBC # BLD: 8.19 K/UL — SIGNIFICANT CHANGE UP (ref 3.8–10.5)
WBC # FLD AUTO: 8.19 K/UL — SIGNIFICANT CHANGE UP (ref 3.8–10.5)

## 2022-09-02 PROCEDURE — 83036 HEMOGLOBIN GLYCOSYLATED A1C: CPT

## 2022-09-02 PROCEDURE — G0108 DIAB MANAGE TRN  PER INDIV: CPT

## 2022-09-02 PROCEDURE — 99284 EMERGENCY DEPT VISIT MOD MDM: CPT

## 2022-09-02 PROCEDURE — 82962 GLUCOSE BLOOD TEST: CPT

## 2022-09-02 PROCEDURE — 99214 OFFICE O/P EST MOD 30 MIN: CPT | Mod: 25

## 2022-09-02 PROCEDURE — 82803 BLOOD GASES ANY COMBINATION: CPT

## 2022-09-02 PROCEDURE — 85025 COMPLETE CBC W/AUTO DIFF WBC: CPT

## 2022-09-02 PROCEDURE — 96360 HYDRATION IV INFUSION INIT: CPT

## 2022-09-02 PROCEDURE — 99283 EMERGENCY DEPT VISIT LOW MDM: CPT | Mod: 25

## 2022-09-02 PROCEDURE — 82009 KETONE BODYS QUAL: CPT

## 2022-09-02 PROCEDURE — 80053 COMPREHEN METABOLIC PANEL: CPT

## 2022-09-02 PROCEDURE — 36415 COLL VENOUS BLD VENIPUNCTURE: CPT

## 2022-09-02 RX ORDER — PREDNISONE 20 MG/1
20 TABLET ORAL
Refills: 0 | Status: DISCONTINUED | COMMUNITY
End: 2022-09-02

## 2022-09-02 RX ORDER — SODIUM CHLORIDE 9 MG/ML
2000 INJECTION INTRAMUSCULAR; INTRAVENOUS; SUBCUTANEOUS ONCE
Refills: 0 | Status: COMPLETED | OUTPATIENT
Start: 2022-09-02 | End: 2022-09-02

## 2022-09-02 RX ORDER — INSULIN HUMAN 100 [IU]/ML
10 INJECTION, SOLUTION SUBCUTANEOUS ONCE
Refills: 0 | Status: COMPLETED | OUTPATIENT
Start: 2022-09-02 | End: 2022-09-02

## 2022-09-02 RX ADMIN — SODIUM CHLORIDE 2000 MILLILITER(S): 9 INJECTION INTRAMUSCULAR; INTRAVENOUS; SUBCUTANEOUS at 18:33

## 2022-09-02 NOTE — ED ADULT NURSE NOTE - OBJECTIVE STATEMENT
[address] [facility] [id number] [address]
assumed care of pt from triage, pt A&OX4, resp. even and unlabored, pt states she was at the endocrinologist and her sugar was 507, pt received 16 units of insulin at the office, pt was sent here for evaluation when BGL did not go down, pts  here, neg. AMS, neg. signs/symptoms, pt states she ate a lot of junk food before her appt., pt is not completely compliant with meds

## 2022-09-02 NOTE — ED ADULT TRIAGE NOTE - CHIEF COMPLAINT QUOTE
pt sent by MD office for high blood sugar in 500's, was given 16units of Humalog insulin,and   2 bottles of water , sugar raised to 592, sent for evaluation  A&Ox3, resp wnl

## 2022-09-02 NOTE — ED PROVIDER NOTE - PATIENT PORTAL LINK FT
You can access the FollowMyHealth Patient Portal offered by [facility] by registering at the following website: [email or web address]. By joining [facility]’s FollowMyHealth portal, you will also be able to view your health information using other applications (apps) compatible with our system.

## 2022-09-02 NOTE — ASSESSMENT
[FreeTextEntry1] : VISIT COMPLETED BUT THEN PT SENT TO HOSPITAL FOR RISING BLOOD SUGARS AFTER GIVING 16 UNITS OF INSULIN- LIKELY DUE TO HER DIET CHOICES PRIOR TO HER VISIT BUT NEED TO RULE OUT HHNK/DKA\par \par 60 yr old female with:\par 1. poorly controlled T2DM with possible retinopathy- A1c 12.6%\par - she decline insulin and GLP1 agonist due to needle phobia\par - resume Ernst CGM, she declined Libre2\par - increase MFN  mg 2 tabs BID,\par -Begin Januvia 100 mg daily\par -Alot of consuleing done today, pt blood sugars trending dangerously high- her diet seems to be biggest barrier and that she drinks soda!\par - needs to adhere w diabetic diet, met with CDE after me, she feels she knows what she has to do and just has to do it\par - repeat A1c this month and in 3 months\par - also needle phobic and cannot test FS w lancet and prefers to use Freestyle Ernst. Must place ernst on today.\par - f/u ophthalmology and retina\par \par 2. HLD -cont statin, check levels\par \par 3. small Left thyroid nodules - she is asx and euthyroid, awaiting results of repeat sonogram\par \par \par Pt needs frequent follow up for accountability \par RTO 6 weeks\par

## 2022-09-02 NOTE — ED ADULT NURSE REASSESSMENT NOTE - NS ED NURSE REASSESS COMMENT FT1
Assumed care of pt from FERNANDO Fiore. Pt resting comforbtly in stretcher. Pt denies chest pain, SOB, n/v/d, fevers, chills. Respirations even and unlabored. pt educated on plan of care, pt able to successfully teach back plan of care to RN, RN will continue to reeducate pt during hospital stay.

## 2022-09-02 NOTE — PHYSICAL EXAM
[Alert] : alert [Well Nourished] : well nourished [No Acute Distress] : no acute distress [Normal Sclera/Conjunctiva] : normal sclera/conjunctiva [Normal Hearing] : hearing was normal [No Accessory Muscle Use] : no accessory muscle use [Clear to Auscultation] : lungs were clear to auscultation bilaterally [Normal S1, S2] : normal S1 and S2 [Normal Rate] : heart rate was normal [Not Tender] : non-tender [Soft] : abdomen soft [Normal Gait] : normal gait [No Rash] : no rash [No Tremors] : no tremors [Oriented x3] : oriented to person, place, and time [de-identified] : bilateral thyroid nodules

## 2022-09-02 NOTE — ED PROVIDER NOTE - NSFOLLOWUPINSTRUCTIONS_ED_ALL_ED_FT
Take 1000mg metformin twice daily.   Hyperglycemia    Hyperglycemia occurs when the glucose (sugar) level in your blood is too high. Symptoms include increased urination, increased thirst, a dry mouth, or changes in appetite. If started on a medication, take exactly as prescribed by your health care professional. Maintain a healthy lifestyle and follow up with your primary care physician.    SEEK IMMEDIATE MEDICAL CARE IF YOU HAVE ANY OF THE FOLLOWING SYMPTOMS: shortness of breath, change in mental status, nausea or vomiting, fruity smell to your breath, or any signs of dehydration.

## 2022-09-02 NOTE — ED PROVIDER NOTE - DATE/TIME 1
What Type Of Note Output Would You Prefer (Optional)?: Bullet Format Hpi Title: Evaluation of Skin Lesions 02-Sep-2022 20:44

## 2022-09-02 NOTE — HISTORY OF PRESENT ILLNESS
[FreeTextEntry1] : Interval Hx -Was prescribed MFN 4 tabs daily but is only taking 2 tabs. \par Has not had a ernst on for months. has them at home but never started it. \par Does not check finger sticks\par FS in office 507-had mcdonalds for lunch (2 cheeseburgers, fries, a regular soda, cookies)\par 6 units of Novolog given 12/03/2023 [id number]\par Negative ketones\par FS 30 mins later 547\par Gave 10 units of Novolog \par 12/03/2023 [id number]\par FS now 592- sent pt to hospital\par =========================================================================\par Quality/Duration/Onset: Goiter on exam for many years. Thyroid sonogram 3/2021 showed thyroid nodules.\par Severity: mild\par Associated symptoms: Denies anterior neck pain, dysphagia or voice changes. No thyroid meds. No herbal meds.\par \par MODIFYING FACTORS: \par Medication history - no thyroid meds\par Current thyroid medication: none\par Herbal meds/OTC - cod liver oil, vit D, turmeric\par No family history of thyroid disease. No radiation exposure to the neck\par \par Pt did her sonogram yesterday- no results yet\par ========================================================================\par Quality: Type 2\par Severity: severe, uncontrolled\par Duration:2012\par Onset: routine blood test, gluc 350's\par \par ASSOCIATED SYMPTOMS/COMPLICATIONS:\par Eye exam - (+) retinal issue, eye doctor - Dr. Walker, retina: Dr. Leif Barreto ([facility])\par Urine microalb/Cr neg 2022\par denies neuropathy\par \par MODIFYING FACTORS: worsening due to diet, refusal of meds (oral and insulin) in past\par \par Current DM meds: MFN  mg 2 tabs daily since 4/2022\par \par SMBG: needle phobia, not using Ernst CGM\par

## 2022-09-02 NOTE — ED PROVIDER NOTE - OBJECTIVE STATEMENT
61 y/o female hx NIDDM present for hyperglycemia. Is prescribed 1000mg metformin bid but takes it only once a day. States had a lot of junk food today including a lot of mcdonalds, then went to endocrinology appointment, was given humalog there but sugars persisted so sent to ED. Pt without complaints. no f/c, no abd pain, vomiting, urinary symptoms or other complaints.  was told to increase her metformin to BID and get her a1c checked.    ROS: No fever/chills. No eye pain/changes in vision, No ear pain/sore throat/dysphagia, No chest pain/palpitations. No SOB/cough/. No abdominal pain, N/V/D, no black/bloody bm. No dysuria/frequency/discharge, No headache. No Dizziness.    No rashes or breaks in skin. No numbness/tingling/weakness.

## 2022-09-02 NOTE — ED PROVIDER NOTE - CLINICAL SUMMARY MEDICAL DECISION MAKING FREE TEXT BOX
hypergllycemia, likely related to poor diet/medication compliance. no symptoms. will hydrate, check labs, possible insulin for treatment in ED. Has endocrine f/u and to increase her metformin.

## 2022-09-02 NOTE — REVIEW OF SYSTEMS
[Fatigue] : no fatigue [Recent Weight Gain (___ Lbs)] : no recent weight gain [Recent Weight Loss (___ Lbs)] : no recent weight loss [Visual Field Defect] : no visual field defect [Blurred Vision] : no blurred vision [Dysphagia] : no dysphagia [Neck Pain] : no neck pain [Dysphonia] : no dysphonia [Chest Pain] : no chest pain [Shortness Of Breath] : no shortness of breath [Constipation] : no constipation [Diarrhea] : no diarrhea [Polyuria] : no polyuria [Polydipsia] : no polydipsia

## 2022-09-06 ENCOUNTER — NON-APPOINTMENT (OUTPATIENT)
Age: 62
End: 2022-09-06

## 2022-09-08 ENCOUNTER — NON-APPOINTMENT (OUTPATIENT)
Age: 62
End: 2022-09-08

## 2022-09-23 ENCOUNTER — NON-APPOINTMENT (OUTPATIENT)
Age: 62
End: 2022-09-23

## 2022-11-15 ENCOUNTER — APPOINTMENT (OUTPATIENT)
Dept: OBGYN | Facility: CLINIC | Age: 62
End: 2022-11-15

## 2022-11-15 VITALS
BODY MASS INDEX: 32.14 KG/M2 | WEIGHT: 217 LBS | DIASTOLIC BLOOD PRESSURE: 82 MMHG | SYSTOLIC BLOOD PRESSURE: 122 MMHG | HEIGHT: 69 IN

## 2022-11-15 DIAGNOSIS — Z01.419 ENCOUNTER FOR GYNECOLOGICAL EXAMINATION (GENERAL) (ROUTINE) W/OUT ABNORMAL FINDINGS: ICD-10-CM

## 2022-11-15 PROCEDURE — 99396 PREV VISIT EST AGE 40-64: CPT

## 2022-11-15 PROCEDURE — 99386 PREV VISIT NEW AGE 40-64: CPT

## 2022-11-17 LAB — HPV HIGH+LOW RISK DNA PNL CVX: NOT DETECTED

## 2022-11-19 LAB — CYTOLOGY CVX/VAG DOC THIN PREP: NORMAL

## 2023-02-07 ENCOUNTER — RX RENEWAL (OUTPATIENT)
Age: 63
End: 2023-02-07

## 2023-02-28 ENCOUNTER — APPOINTMENT (OUTPATIENT)
Dept: MAMMOGRAPHY | Facility: CLINIC | Age: 63
End: 2023-02-28
Payer: COMMERCIAL

## 2023-02-28 ENCOUNTER — RESULT REVIEW (OUTPATIENT)
Age: 63
End: 2023-02-28

## 2023-02-28 ENCOUNTER — APPOINTMENT (OUTPATIENT)
Dept: ULTRASOUND IMAGING | Facility: CLINIC | Age: 63
End: 2023-02-28

## 2023-02-28 PROCEDURE — 77067 SCR MAMMO BI INCL CAD: CPT

## 2023-02-28 PROCEDURE — 77063 BREAST TOMOSYNTHESIS BI: CPT

## 2023-02-28 PROCEDURE — 76641 ULTRASOUND BREAST COMPLETE: CPT | Mod: 50

## 2023-03-06 ENCOUNTER — APPOINTMENT (OUTPATIENT)
Dept: BREAST CENTER | Facility: CLINIC | Age: 63
End: 2023-03-06
Payer: COMMERCIAL

## 2023-03-06 VITALS
WEIGHT: 213 LBS | HEIGHT: 69 IN | DIASTOLIC BLOOD PRESSURE: 86 MMHG | HEART RATE: 76 BPM | TEMPERATURE: 97.3 F | BODY MASS INDEX: 31.55 KG/M2 | SYSTOLIC BLOOD PRESSURE: 151 MMHG

## 2023-03-06 DIAGNOSIS — Z91.89 OTHER SPECIFIED PERSONAL RISK FACTORS, NOT ELSEWHERE CLASSIFIED: ICD-10-CM

## 2023-03-06 DIAGNOSIS — N60.19 DIFFUSE CYSTIC MASTOPATHY OF UNSPECIFIED BREAST: ICD-10-CM

## 2023-03-06 PROCEDURE — 99213 OFFICE O/P EST LOW 20 MIN: CPT

## 2023-03-06 RX ORDER — PSYLLIUM HUSK 0.4 G
CAPSULE ORAL
Refills: 0 | Status: DISCONTINUED | COMMUNITY
End: 2023-03-06

## 2023-03-06 RX ORDER — SITAGLIPTIN 100 MG/1
100 TABLET, FILM COATED ORAL
Qty: 90 | Refills: 1 | Status: DISCONTINUED | COMMUNITY
Start: 2022-09-02 | End: 2023-03-06

## 2023-03-06 RX ORDER — METFORMIN ER 500 MG 500 MG/1
500 TABLET ORAL
Qty: 360 | Refills: 3 | Status: DISCONTINUED | COMMUNITY
Start: 2023-02-07 | End: 2023-03-06

## 2023-03-06 RX ORDER — FLASH GLUCOSE SCANNING READER
EACH MISCELLANEOUS
Qty: 1 | Refills: 0 | Status: DISCONTINUED | COMMUNITY
Start: 2021-04-20 | End: 2023-03-06

## 2023-03-06 RX ORDER — FLASH GLUCOSE SENSOR
KIT MISCELLANEOUS
Qty: 6 | Refills: 3 | Status: DISCONTINUED | COMMUNITY
Start: 2022-08-10 | End: 2023-03-06

## 2023-03-06 RX ORDER — ATORVASTATIN CALCIUM 20 MG/1
20 TABLET, FILM COATED ORAL
Refills: 0 | Status: DISCONTINUED | COMMUNITY
End: 2023-03-06

## 2023-03-06 RX ORDER — FLASH GLUCOSE SENSOR
KIT MISCELLANEOUS
Qty: 6 | Refills: 1 | Status: DISCONTINUED | COMMUNITY
Start: 2021-04-20 | End: 2023-03-06

## 2023-03-06 RX ORDER — CETIRIZINE HCL 10 MG
TABLET ORAL
Refills: 0 | Status: DISCONTINUED | COMMUNITY
End: 2023-03-06

## 2023-03-06 RX ORDER — ERGOCALCIFEROL 1.25 MG/1
1.25 MG CAPSULE, LIQUID FILLED ORAL
Qty: 12 | Refills: 0 | Status: DISCONTINUED | COMMUNITY
Start: 2021-04-20 | End: 2023-03-06

## 2023-03-06 NOTE — HISTORY OF PRESENT ILLNESS
[FreeTextEntry1] : Referred by Dr. Varsha Brunson\par \par 63 y/o black female here for f/u for HRP. Patient is asking about deferring mmg and proceeding with only US and MRI in the future as she is concerned flatting of the breast during mmg may course "leeching of abnormal tissue into the body."\par Pt denies any breast lesions, discharge or masses. Has an area of breast thickening after hidradenitis she has had for years. Saw dermatologist previously.\par \par 2/22/22, Ashly, Arya sMMG: FG, no susp findings; Bilat U/S: bilat no susp solid mass, rec mmg 1 year, BR1\par 8/22/22 Ashly, MRI: FG. No susp enhancement bilaterally. No significant axillary or internal mammary lymphadenopathy. Rec MRI in 1yr. BR 2\par \par 2/28/23 Ashly, Bilat sMMG and US: TC 20.4%. FG. No susp mass, microcals or other sign of malignancy is identified. US: No susp solid mass bilaterally. No mmg or US sign of malignancy. Rec mmg in 1 yr. BR1\par \par Fhx; Breast cancer mother at 58 and pGM at 70. Pt is not AJ and no daughters. Has 1 sister.

## 2023-03-06 NOTE — ASSESSMENT
[FreeTextEntry1] : Referred by Dr. Varsha Brunson\par \par 63 y/o black female here for f/u for HRP. Patient is asking about deferring mmg and proceeding with only US and MRI in the future as she is concerned flattening of the breast during mmg may course "leeching of abnormal tissue into the body."\par Pt denies any breast lesions, discharge or masses. Has an area of breast thickening after hidradenitis she has had for years. Saw dermatologist previously.\par \par 2/22/22, Ashly, Bilat sMMG: FG, no susp findings; Bilat U/S: bilat no susp solid mass, rec mmg 1 year, BR1\par 8/22/22 Ashly, MRI: FG. No susp enhancement bilaterally. No significant axillary or internal mammary lymphadenopathy. Rec MRI in 1yr. BR 2\par \par 2/28/23 Ashly, Bilat sMMG and US: TC 20.4%. FG. No susp mass, microcals or other sign of malignancy is identified. US: No susp solid mass bilaterally. No mmg or US sign of malignancy. Rec mmg in 1 yr. BR1\par \par Fhx; Breast cancer mother at 58 and pGM at 70. Pt is not AJ and no daughters. Has 1 sister. \par \par CBE: No suspicious findings. Keloid/hypertrophic scar at inferior left breast from hx of hidradenitis. No axillary or SC lymphadenopathy.\par Reviewed recent imaging - Benign findings. sMRI due 8/23. \par Pt will f/u after Bilat sMMG and U/S w/ me 2/24 or sooner as needed. \par Reviewed with pt MMG is rec for screening not just u/s as u/s does not  microcalcs. Also pt reassured no evidence to support mmg increases risk of breast cancer.  She does not meet NCCN criteria for genetic testing coverage but she may consider out of pocket. Brochure given on genetic testing. \par Pt may resume care under Dr. Varsha Brunson since she is back.

## 2023-03-06 NOTE — PHYSICAL EXAM
[Normocephalic] : normocephalic [Atraumatic] : atraumatic [Supple] : supple [No Supraclavicular Adenopathy] : no supraclavicular adenopathy [No Thyromegaly] : no thyromegaly [Examined in the supine and seated position] : examined in the supine and seated position [Symmetrical] : symmetrical [Bra Size: ___] : Bra Size: [unfilled] [No dominant masses] : no dominant masses in right breast  [No dominant masses] : no dominant masses left breast [No Nipple Retraction] : no left nipple retraction [No Nipple Discharge] : no left nipple discharge [No Axillary Lymphadenopathy] : no left axillary lymphadenopathy [No Edema] : no edema [No Swelling] : no swelling [Full ROM] : full range of motion [No Rashes] : no rashes [No Ulceration] : no ulceration [de-identified] : inferior scar from prior hidradenitis

## 2023-08-28 ENCOUNTER — APPOINTMENT (OUTPATIENT)
Dept: MRI IMAGING | Facility: CLINIC | Age: 63
End: 2023-08-28
Payer: COMMERCIAL

## 2023-08-28 PROCEDURE — A9579: CPT

## 2023-08-28 PROCEDURE — 77049 MRI BREAST C-+ W/CAD BI: CPT

## 2023-08-28 PROCEDURE — A9585: CPT

## 2023-08-29 ENCOUNTER — NON-APPOINTMENT (OUTPATIENT)
Age: 63
End: 2023-08-29

## 2023-09-01 ENCOUNTER — APPOINTMENT (OUTPATIENT)
Dept: BREAST CENTER | Facility: CLINIC | Age: 63
End: 2023-09-01
Payer: COMMERCIAL

## 2023-09-01 VITALS
DIASTOLIC BLOOD PRESSURE: 80 MMHG | TEMPERATURE: 97.3 F | HEART RATE: 85 BPM | BODY MASS INDEX: 31.1 KG/M2 | WEIGHT: 210 LBS | SYSTOLIC BLOOD PRESSURE: 147 MMHG | HEIGHT: 69 IN

## 2023-09-01 DIAGNOSIS — Z00.00 ENCOUNTER FOR GENERAL ADULT MEDICAL EXAMINATION W/OUT ABNORMAL FINDINGS: ICD-10-CM

## 2023-09-01 DIAGNOSIS — R92.2 INCONCLUSIVE MAMMOGRAM: ICD-10-CM

## 2023-09-01 DIAGNOSIS — Z91.89 OTHER SPECIFIED PERSONAL RISK FACTORS, NOT ELSEWHERE CLASSIFIED: ICD-10-CM

## 2023-09-01 PROCEDURE — 99213 OFFICE O/P EST LOW 20 MIN: CPT

## 2023-09-01 RX ORDER — METFORMIN HYDROCHLORIDE 500 MG/1
500 TABLET, COATED ORAL
Refills: 0 | Status: DISCONTINUED | COMMUNITY
Start: 2023-09-01 | End: 2023-09-01

## 2023-09-01 NOTE — HISTORY OF PRESENT ILLNESS
[FreeTextEntry1] : Referred by Dr. Varsha Brunson  62 y/o black female here today to review MRI results and for CBE.  Pt denies any breast lesions, discharge or masses. Has an area of breast thickening after hidradenitis she has had for years. Saw dermatologist previously.  Back in June she had a dog bite to her stomach, was visiting friends in [place] and it was the homeowners dog, she is unhappy with the scar this has left.  2/22/22, Arya Limon sMMG: FG, no susp findings; Bilat U/S: bilat no susp solid mass, rec mmg 1 year, BR1 8/22/22 Ashly, MRI: FG. No susp enhancement bilaterally. No significant axillary or internal mammary lymphadenopathy. Rec MRI in 1yr. BR 2  2/28/23 Arya Limon sMMG and US: TC 20.4%. FG. No susp mass, microcals or other sign of malignancy is identified. US: No susp solid mass bilaterally. No mmg or US sign of malignancy. Rec mmg in 1 yr. BR1 8/28/23 Ashly, MRI: No susp enhancement bilaterally. Axilla- negative. No MRI evidence of malignancy. Resume annual mmg on schedule. BR1.  Fhx; Breast cancer mother at 58 and pGM at 70. Pt is not AJ and no daughters. Has 1 sister.

## 2023-09-01 NOTE — ASSESSMENT
[FreeTextEntry1] : Referred by Dr. Varsha Brunson  62 y/o black female here today to review MRI results.  Pt denies any breast lesions, discharge or masses. Has an area of breast thickening after hidradenitis she has had for years. Saw dermatologist previously.  Back in June she had a dog bite to her stomach, was visiting friends in [place] and it was the homeowners dog, she is unhappy with the scar this has left.  2/22/22, Arya Limon sMMG: FG, no susp findings; Bilat U/S: bilat no susp solid mass, rec mmg 1 year, BR1 8/22/22 Ashly, MRI: FG. No susp enhancement bilaterally. No significant axillary or internal mammary lymphadenopathy. Rec MRI in 1yr. BR 2  2/28/23 Arya Limon sMMG and US: TC 20.4%. FG. No susp mass, microcals or other sign of malignancy is identified. US: No susp solid mass bilaterally. No mmg or US sign of malignancy. Rec mmg in 1 yr. BR1 8/28/23 Ashly, MRI: No susp enhancement bilaterally. Axilla- negative. No MRI evidence of malignancy. Resume annual mmg on schedule. BR1.  Fhx; Breast cancer mother at 58 and pGM at 70. Pt is not AJ and no daughters. Has 1 sister.  CBE: No suspicious findings. Keloid/hypertrophic scar at inferior left breast from hx of hidradenitis. No axillary or SC lymphadenopathy. Reviewed results of CBE and MRI. Rec sMMG and US 2/2024, ok to alternate CBE with PCP/GYN and our office. If sMMG/US wnl can follow up in 1yr, patient aware to call for sooner appointment if breast concerns. Patient also currently off of metformin, states she follows with Endo but last appointment was over a year ago. Discussed importance of close monitoring for DM, patient states she has a follow up scheduled 10/10 with Dr. Dickson.

## 2023-09-01 NOTE — DATA REVIEWED
[FreeTextEntry1] : 8/28/23 Ashly, MRI: No susp enhancement bilaterally. Axilla- negative. No MRI evidence of malignancy. Resume annual mmg on schedule. BR1.

## 2023-09-01 NOTE — PHYSICAL EXAM
[Normocephalic] : normocephalic [Atraumatic] : atraumatic [Supple] : supple [No Supraclavicular Adenopathy] : no supraclavicular adenopathy [No Thyromegaly] : no thyromegaly [Examined in the supine and seated position] : examined in the supine and seated position [Symmetrical] : symmetrical [Bra Size: ___] : Bra Size: [unfilled] [No dominant masses] : no dominant masses in right breast  [No dominant masses] : no dominant masses left breast [No Nipple Retraction] : no left nipple retraction [No Nipple Discharge] : no left nipple discharge [No Axillary Lymphadenopathy] : no left axillary lymphadenopathy [No Edema] : no edema [No Swelling] : no swelling [Full ROM] : full range of motion [No Rashes] : no rashes [No Ulceration] : no ulceration [de-identified] : inferior scar from prior hidradenitis [TextEntry] : Psych: appropriate, A&Ox3 Neuro: intact grossly, gait normal

## 2023-10-10 ENCOUNTER — APPOINTMENT (OUTPATIENT)
Dept: ENDOCRINOLOGY | Facility: CLINIC | Age: 63
End: 2023-10-10

## 2023-11-18 NOTE — PHYSICAL EXAM
[Normocephalic] : normocephalic [Atraumatic] : atraumatic [Supple] : supple [No Supraclavicular Adenopathy] : no supraclavicular adenopathy [No Thyromegaly] : no thyromegaly [Examined in the supine and seated position] : examined in the supine and seated position [Bra Size: ___] : Bra Size: [unfilled] [No dominant masses] : no dominant masses in right breast  [No dominant masses] : no dominant masses left breast [No Nipple Retraction] : no left nipple retraction [No Nipple Discharge] : no left nipple discharge [No Axillary Lymphadenopathy] : no left axillary lymphadenopathy [No Edema] : no edema [No Swelling] : no swelling [Full ROM] : full range of motion [No Rashes] : no rashes [No Ulceration] : no ulceration [de-identified] : has goiter [de-identified] : Inferior part of flap, consistent with 2 cm area of hypertrophic/keloid scar.  16

## 2023-11-21 ENCOUNTER — APPOINTMENT (OUTPATIENT)
Dept: OBGYN | Facility: CLINIC | Age: 63
End: 2023-11-21

## 2023-11-28 ENCOUNTER — APPOINTMENT (OUTPATIENT)
Dept: OBGYN | Facility: CLINIC | Age: 63
End: 2023-11-28
Payer: COMMERCIAL

## 2023-11-28 VITALS
BODY MASS INDEX: 30.96 KG/M2 | WEIGHT: 209 LBS | SYSTOLIC BLOOD PRESSURE: 135 MMHG | DIASTOLIC BLOOD PRESSURE: 84 MMHG | HEIGHT: 69 IN

## 2023-11-28 DIAGNOSIS — Z01.419 ENCOUNTER FOR GYNECOLOGICAL EXAMINATION (GENERAL) (ROUTINE) W/OUT ABNORMAL FINDINGS: ICD-10-CM

## 2023-11-28 PROCEDURE — 99212 OFFICE O/P EST SF 10 MIN: CPT | Mod: 25

## 2023-11-28 PROCEDURE — 99396 PREV VISIT EST AGE 40-64: CPT | Mod: 25

## 2023-11-30 LAB
DATE COLLECTED: NORMAL
HEMOCCULT SP1 STL QL: NEGATIVE
HPV HIGH+LOW RISK DNA PNL CVX: NOT DETECTED
QUALITY CONTROL: YES

## 2023-12-09 ENCOUNTER — NON-APPOINTMENT (OUTPATIENT)
Age: 63
End: 2023-12-09

## 2023-12-09 LAB — CYTOLOGY CVX/VAG DOC THIN PREP: NORMAL

## 2024-01-16 ENCOUNTER — APPOINTMENT (OUTPATIENT)
Dept: ANTEPARTUM | Facility: CLINIC | Age: 64
End: 2024-01-16

## 2024-01-16 ENCOUNTER — APPOINTMENT (OUTPATIENT)
Dept: OBGYN | Facility: CLINIC | Age: 64
End: 2024-01-16

## 2024-01-17 ENCOUNTER — ASOB RESULT (OUTPATIENT)
Age: 64
End: 2024-01-17

## 2024-01-17 ENCOUNTER — APPOINTMENT (OUTPATIENT)
Dept: ANTEPARTUM | Facility: CLINIC | Age: 64
End: 2024-01-17
Payer: COMMERCIAL

## 2024-01-17 PROCEDURE — 76830 TRANSVAGINAL US NON-OB: CPT

## 2024-01-17 PROCEDURE — 76856 US EXAM PELVIC COMPLETE: CPT | Mod: 59

## 2024-01-18 ENCOUNTER — APPOINTMENT (OUTPATIENT)
Dept: OBGYN | Facility: CLINIC | Age: 64
End: 2024-01-18
Payer: COMMERCIAL

## 2024-01-18 PROCEDURE — 99446 NTRPROF PH1/NTRNET/EHR 5-10: CPT

## 2024-01-18 NOTE — HISTORY OF PRESENT ILLNESS
[FreeTextEntry1] : 64yo G1 P 0010 PMF with T2DM and HTN is being followed up via telehealth visit for results review. She had a TVUS which demonstrated a stable 6cm fibroid, 2mm EMS and non-visualized ovaries b/l. Her pap and HPV are neg.

## 2024-01-18 NOTE — REASON FOR VISIT
[Home] : at home, [unfilled] , at the time of the visit. [Medical Office: ([facility])___] : at the medical office located in  [Verbal consent obtained from patient] : the patient, [unfilled] [Follow-Up] : a follow-up evaluation of

## 2024-01-18 NOTE — DISCUSSION/SUMMARY
[FreeTextEntry1] : 62yo G1 P 0010 PMF with T2DM and HTN is being followed up via telehealth visit for results review with stable findings imaging.   - Has appt for mammo/sono in March (being followed by Dr Carver - Encouraged her to call imaging facility to see if DEXA can be added on for that day  - All questions were solicited and answered   Varsha Brunson MD  Obstetrician/Gynecologist

## 2024-03-01 ENCOUNTER — APPOINTMENT (OUTPATIENT)
Dept: MAMMOGRAPHY | Facility: CLINIC | Age: 64
End: 2024-03-01
Payer: COMMERCIAL

## 2024-03-01 ENCOUNTER — APPOINTMENT (OUTPATIENT)
Dept: ULTRASOUND IMAGING | Facility: CLINIC | Age: 64
End: 2024-03-01
Payer: COMMERCIAL

## 2024-03-01 ENCOUNTER — RESULT REVIEW (OUTPATIENT)
Age: 64
End: 2024-03-01

## 2024-03-01 PROCEDURE — 77067 SCR MAMMO BI INCL CAD: CPT

## 2024-03-01 PROCEDURE — 76641 ULTRASOUND BREAST COMPLETE: CPT | Mod: 50

## 2024-03-01 PROCEDURE — 77063 BREAST TOMOSYNTHESIS BI: CPT

## 2024-03-04 ENCOUNTER — NON-APPOINTMENT (OUTPATIENT)
Age: 64
End: 2024-03-04

## 2024-04-17 ENCOUNTER — NON-APPOINTMENT (OUTPATIENT)
Age: 64
End: 2024-04-17

## 2024-04-17 ENCOUNTER — APPOINTMENT (OUTPATIENT)
Dept: ULTRASOUND IMAGING | Facility: CLINIC | Age: 64
End: 2024-04-17
Payer: COMMERCIAL

## 2024-04-17 ENCOUNTER — RESULT REVIEW (OUTPATIENT)
Age: 64
End: 2024-04-17

## 2024-04-17 ENCOUNTER — APPOINTMENT (OUTPATIENT)
Dept: MAMMOGRAPHY | Facility: CLINIC | Age: 64
End: 2024-04-17
Payer: COMMERCIAL

## 2024-04-17 DIAGNOSIS — R92.8 OTHER ABNORMAL AND INCONCLUSIVE FINDINGS ON DIAGNOSTIC IMAGING OF BREAST: ICD-10-CM

## 2024-04-17 PROCEDURE — 76642 ULTRASOUND BREAST LIMITED: CPT | Mod: LT

## 2024-04-17 PROCEDURE — 77065 DX MAMMO INCL CAD UNI: CPT | Mod: RT

## 2024-05-01 ENCOUNTER — RESULT REVIEW (OUTPATIENT)
Age: 64
End: 2024-05-01

## 2024-05-01 ENCOUNTER — NON-APPOINTMENT (OUTPATIENT)
Age: 64
End: 2024-05-01

## 2024-05-01 ENCOUNTER — APPOINTMENT (OUTPATIENT)
Dept: MAMMOGRAPHY | Facility: CLINIC | Age: 64
End: 2024-05-01
Payer: COMMERCIAL

## 2024-05-01 ENCOUNTER — APPOINTMENT (OUTPATIENT)
Dept: ULTRASOUND IMAGING | Facility: CLINIC | Age: 64
End: 2024-05-01
Payer: COMMERCIAL

## 2024-05-01 PROCEDURE — A4648: CPT | Mod: 59

## 2024-05-01 PROCEDURE — 77066 DX MAMMO INCL CAD BI: CPT

## 2024-05-01 PROCEDURE — 19081 BX BREAST 1ST LESION STRTCTC: CPT | Mod: RT

## 2024-05-01 PROCEDURE — 19083 BX BREAST 1ST LESION US IMAG: CPT | Mod: LT,59

## 2024-05-08 ENCOUNTER — NON-APPOINTMENT (OUTPATIENT)
Age: 64
End: 2024-05-08

## 2024-10-08 ENCOUNTER — NON-APPOINTMENT (OUTPATIENT)
Age: 64
End: 2024-10-08

## 2024-10-08 ENCOUNTER — APPOINTMENT (OUTPATIENT)
Dept: FAMILY MEDICINE | Facility: CLINIC | Age: 64
End: 2024-10-08
Payer: COMMERCIAL

## 2024-10-08 VITALS
BODY MASS INDEX: 29.47 KG/M2 | HEIGHT: 69 IN | HEART RATE: 79 BPM | DIASTOLIC BLOOD PRESSURE: 86 MMHG | OXYGEN SATURATION: 98 % | SYSTOLIC BLOOD PRESSURE: 138 MMHG | WEIGHT: 199 LBS

## 2024-10-08 DIAGNOSIS — E78.5 HYPERLIPIDEMIA, UNSPECIFIED: ICD-10-CM

## 2024-10-08 DIAGNOSIS — E11.65 TYPE 2 DIABETES MELLITUS WITH HYPERGLYCEMIA: ICD-10-CM

## 2024-10-08 DIAGNOSIS — Z00.00 ENCOUNTER FOR GENERAL ADULT MEDICAL EXAMINATION W/OUT ABNORMAL FINDINGS: ICD-10-CM

## 2024-10-08 DIAGNOSIS — Z91.89 OTHER SPECIFIED PERSONAL RISK FACTORS, NOT ELSEWHERE CLASSIFIED: ICD-10-CM

## 2024-10-08 DIAGNOSIS — Z13.6 ENCOUNTER FOR SCREENING FOR CARDIOVASCULAR DISORDERS: ICD-10-CM

## 2024-10-08 DIAGNOSIS — E04.2 NONTOXIC MULTINODULAR GOITER: ICD-10-CM

## 2024-10-08 PROCEDURE — 93000 ELECTROCARDIOGRAM COMPLETE: CPT

## 2024-10-08 PROCEDURE — 99386 PREV VISIT NEW AGE 40-64: CPT

## 2024-10-08 PROCEDURE — 36415 COLL VENOUS BLD VENIPUNCTURE: CPT

## 2024-10-17 LAB
25(OH)D3 SERPL-MCNC: 11.7 NG/ML
ALBUMIN SERPL ELPH-MCNC: 4.1 G/DL
ALP BLD-CCNC: 106 U/L
ALT SERPL-CCNC: 12 U/L
ANION GAP SERPL CALC-SCNC: 14 MMOL/L
APPEARANCE: CLEAR
AST SERPL-CCNC: 10 U/L
BACTERIA: NEGATIVE /HPF
BASOPHILS # BLD AUTO: 0.01 K/UL
BASOPHILS NFR BLD AUTO: 0.2 %
BILIRUB SERPL-MCNC: 0.3 MG/DL
BILIRUBIN URINE: NEGATIVE
BLOOD URINE: NEGATIVE
BUN SERPL-MCNC: 12 MG/DL
CALCIUM SERPL-MCNC: 9.5 MG/DL
CAST: 5 /LPF
CHLORIDE SERPL-SCNC: 98 MMOL/L
CHOLEST SERPL-MCNC: 228 MG/DL
CO2 SERPL-SCNC: 23 MMOL/L
COLOR: YELLOW
CREAT SERPL-MCNC: 0.6 MG/DL
EGFR: 101 ML/MIN/1.73M2
EOSINOPHIL # BLD AUTO: 0.16 K/UL
EOSINOPHIL NFR BLD AUTO: 2.7 %
EPITHELIAL CELLS: 3 /HPF
ESTIMATED AVERAGE GLUCOSE: 344 MG/DL
GLUCOSE QUALITATIVE U: >=1000 MG/DL
GLUCOSE SERPL-MCNC: 384 MG/DL
HBA1C MFR BLD HPLC: 13.6 %
HCT VFR BLD CALC: 41.6 %
HCV AB SER QL: NONREACTIVE
HCV S/CO RATIO: 0.12 S/CO
HDLC SERPL-MCNC: 65 MG/DL
HGB BLD-MCNC: 12.6 G/DL
IMM GRANULOCYTES NFR BLD AUTO: 0.2 %
KETONES URINE: NEGATIVE MG/DL
LDLC SERPL CALC-MCNC: 151 MG/DL
LEUKOCYTE ESTERASE URINE: NEGATIVE
LYMPHOCYTES # BLD AUTO: 2.04 K/UL
LYMPHOCYTES NFR BLD AUTO: 34.2 %
MAN DIFF?: NORMAL
MCHC RBC-ENTMCNC: 28.6 PG
MCHC RBC-ENTMCNC: 30.3 GM/DL
MCV RBC AUTO: 94.5 FL
MICROSCOPIC-UA: NORMAL
MONOCYTES # BLD AUTO: 0.37 K/UL
MONOCYTES NFR BLD AUTO: 6.2 %
NEUTROPHILS # BLD AUTO: 3.38 K/UL
NEUTROPHILS NFR BLD AUTO: 56.5 %
NITRITE URINE: NEGATIVE
NONHDLC SERPL-MCNC: 163 MG/DL
PH URINE: 5.5
PLATELET # BLD AUTO: 200 K/UL
POTASSIUM SERPL-SCNC: 4.8 MMOL/L
PROT SERPL-MCNC: 7.3 G/DL
PROTEIN URINE: NEGATIVE MG/DL
RBC # BLD: 4.4 M/UL
RBC # FLD: 13.3 %
RED BLOOD CELLS URINE: 1 /HPF
REVIEW: NORMAL
SODIUM SERPL-SCNC: 135 MMOL/L
SPECIFIC GRAVITY URINE: >1.03
TRIGL SERPL-MCNC: 66 MG/DL
TSH SERPL-ACNC: 0.78 UIU/ML
UROBILINOGEN URINE: 0.2 MG/DL
WBC # FLD AUTO: 5.97 K/UL
WHITE BLOOD CELLS URINE: 1 /HPF

## 2024-10-21 RX ORDER — ERGOCALCIFEROL 1.25 MG/1
50000 CAPSULE ORAL
Qty: 12 | Refills: 2 | Status: ACTIVE | COMMUNITY
Start: 2024-10-17 | End: 1900-01-01

## 2024-10-23 ENCOUNTER — APPOINTMENT (OUTPATIENT)
Dept: MRI IMAGING | Facility: CLINIC | Age: 64
End: 2024-10-23

## 2024-10-23 PROCEDURE — A9585: CPT | Mod: JW

## 2024-10-23 PROCEDURE — 77049 MRI BREAST C-+ W/CAD BI: CPT

## 2024-10-28 ENCOUNTER — NON-APPOINTMENT (OUTPATIENT)
Age: 64
End: 2024-10-28

## 2024-11-04 ENCOUNTER — APPOINTMENT (OUTPATIENT)
Dept: BREAST CENTER | Facility: CLINIC | Age: 64
End: 2024-11-04
Payer: COMMERCIAL

## 2024-11-04 VITALS
TEMPERATURE: 97.8 F | WEIGHT: 206.4 LBS | BODY MASS INDEX: 30.57 KG/M2 | DIASTOLIC BLOOD PRESSURE: 85 MMHG | SYSTOLIC BLOOD PRESSURE: 158 MMHG | HEIGHT: 69 IN

## 2024-11-04 DIAGNOSIS — Z00.00 ENCOUNTER FOR GENERAL ADULT MEDICAL EXAMINATION W/OUT ABNORMAL FINDINGS: ICD-10-CM

## 2024-11-04 DIAGNOSIS — Z91.89 OTHER SPECIFIED PERSONAL RISK FACTORS, NOT ELSEWHERE CLASSIFIED: ICD-10-CM

## 2024-11-04 DIAGNOSIS — N60.19 DIFFUSE CYSTIC MASTOPATHY OF UNSPECIFIED BREAST: ICD-10-CM

## 2024-11-04 PROCEDURE — 99214 OFFICE O/P EST MOD 30 MIN: CPT

## 2024-11-06 ENCOUNTER — APPOINTMENT (OUTPATIENT)
Dept: FAMILY MEDICINE | Facility: CLINIC | Age: 64
End: 2024-11-06
Payer: COMMERCIAL

## 2024-11-06 VITALS
HEART RATE: 78 BPM | BODY MASS INDEX: 30.51 KG/M2 | WEIGHT: 206 LBS | HEIGHT: 69 IN | DIASTOLIC BLOOD PRESSURE: 83 MMHG | OXYGEN SATURATION: 95 % | SYSTOLIC BLOOD PRESSURE: 140 MMHG

## 2024-11-06 DIAGNOSIS — E11.65 TYPE 2 DIABETES MELLITUS WITH HYPERGLYCEMIA: ICD-10-CM

## 2024-11-06 PROCEDURE — 99214 OFFICE O/P EST MOD 30 MIN: CPT

## 2025-03-24 NOTE — PHYSICAL EXAM
Tested the atrial lead. [Appropriately responsive] : appropriately responsive [Alert] : alert [No Acute Distress] : no acute distress [Thyroid Nodule] : thyroid nodule [Regular Rate Rhythm] : regular rate rhythm [No Murmurs] : no murmurs [Clear to Auscultation B/L] : clear to auscultation bilaterally [Soft] : soft [Non-tender] : non-tender [Non-distended] : non-distended [No HSM] : No HSM [No Lesions] : no lesions [No Mass] : no mass [Oriented x3] : oriented x3 [Examination Of The Breasts] : a normal appearance [No Masses] : no breast masses were palpable [Labia Majora] : normal [Labia Minora] : normal [Normal] : normal [Uterine Adnexae] : normal [FreeTextEntry3] : Pt has enlarged thyroid  [FreeTextEntry6] : right breast with 3-4cm dark, irreg skin lesion. Per pt it has grown. No masses palpated underneath  [No Tenderness] : no tenderness [Occult Blood Positive] : was negative for occult blood analysis

## 2025-05-13 ENCOUNTER — APPOINTMENT (OUTPATIENT)
Dept: FAMILY MEDICINE | Facility: CLINIC | Age: 65
End: 2025-05-13
Payer: COMMERCIAL

## 2025-05-13 VITALS
DIASTOLIC BLOOD PRESSURE: 84 MMHG | WEIGHT: 193 LBS | HEART RATE: 84 BPM | BODY MASS INDEX: 28.58 KG/M2 | HEIGHT: 69 IN | OXYGEN SATURATION: 98 % | SYSTOLIC BLOOD PRESSURE: 132 MMHG

## 2025-05-13 DIAGNOSIS — E11.65 TYPE 2 DIABETES MELLITUS WITH HYPERGLYCEMIA: ICD-10-CM

## 2025-05-13 DIAGNOSIS — M17.0 BILATERAL PRIMARY OSTEOARTHRITIS OF KNEE: ICD-10-CM

## 2025-05-13 DIAGNOSIS — G89.29 LOW BACK PAIN, UNSPECIFIED: ICD-10-CM

## 2025-05-13 DIAGNOSIS — M54.50 LOW BACK PAIN, UNSPECIFIED: ICD-10-CM

## 2025-05-13 DIAGNOSIS — E78.5 HYPERLIPIDEMIA, UNSPECIFIED: ICD-10-CM

## 2025-05-13 DIAGNOSIS — E55.9 VITAMIN D DEFICIENCY, UNSPECIFIED: ICD-10-CM

## 2025-05-13 PROCEDURE — 99214 OFFICE O/P EST MOD 30 MIN: CPT

## 2025-05-13 PROCEDURE — 36415 COLL VENOUS BLD VENIPUNCTURE: CPT

## 2025-05-22 LAB
ANION GAP SERPL CALC-SCNC: 15 MMOL/L
BUN SERPL-MCNC: 17 MG/DL
CALCIUM SERPL-MCNC: 9.2 MG/DL
CHLORIDE SERPL-SCNC: 98 MMOL/L
CHOLEST SERPL-MCNC: 219 MG/DL
CO2 SERPL-SCNC: 21 MMOL/L
CREAT SERPL-MCNC: 0.5 MG/DL
EGFRCR SERPLBLD CKD-EPI 2021: 106 ML/MIN/1.73M2
ESTIMATED AVERAGE GLUCOSE: 332 MG/DL
GLUCOSE SERPL-MCNC: 344 MG/DL
HBA1C MFR BLD HPLC: 13.2 %
HDLC SERPL-MCNC: 72 MG/DL
LDLC SERPL-MCNC: 134 MG/DL
NONHDLC SERPL-MCNC: 148 MG/DL
POTASSIUM SERPL-SCNC: 4.8 MMOL/L
SODIUM SERPL-SCNC: 134 MMOL/L
TRIGL SERPL-MCNC: 79 MG/DL

## 2025-06-17 ENCOUNTER — APPOINTMENT (OUTPATIENT)
Dept: FAMILY MEDICINE | Facility: CLINIC | Age: 65
End: 2025-06-17
Payer: COMMERCIAL

## 2025-06-17 VITALS
OXYGEN SATURATION: 96 % | HEIGHT: 69 IN | WEIGHT: 193 LBS | SYSTOLIC BLOOD PRESSURE: 168 MMHG | BODY MASS INDEX: 28.58 KG/M2 | HEART RATE: 80 BPM | DIASTOLIC BLOOD PRESSURE: 100 MMHG

## 2025-06-17 PROCEDURE — 99214 OFFICE O/P EST MOD 30 MIN: CPT

## 2025-06-17 RX ORDER — ORAL SEMAGLUTIDE 3 MG/1
3 TABLET ORAL
Qty: 30 | Refills: 0 | Status: ACTIVE | COMMUNITY
Start: 2025-06-17 | End: 1900-01-01

## 2025-06-17 RX ORDER — CLOBETASOL PROPIONATE CREAM USP, 0.05% 0.5 MG/G
0.05 CREAM TOPICAL
Refills: 0 | Status: ACTIVE | COMMUNITY
Start: 2025-06-17